# Patient Record
Sex: FEMALE | Race: WHITE
[De-identification: names, ages, dates, MRNs, and addresses within clinical notes are randomized per-mention and may not be internally consistent; named-entity substitution may affect disease eponyms.]

---

## 2019-09-16 ENCOUNTER — HOSPITAL ENCOUNTER (OUTPATIENT)
Dept: HOSPITAL 62 - ER | Age: 79
Setting detail: OBSERVATION
LOS: 3 days | Discharge: HOME HEALTH | End: 2019-09-19
Attending: FAMILY MEDICINE | Admitting: FAMILY MEDICINE
Payer: MEDICARE

## 2019-09-16 DIAGNOSIS — K21.9: ICD-10-CM

## 2019-09-16 DIAGNOSIS — I10: ICD-10-CM

## 2019-09-16 DIAGNOSIS — S22.080A: ICD-10-CM

## 2019-09-16 DIAGNOSIS — M81.0: ICD-10-CM
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
## 2019-09-16 DIAGNOSIS — R51: ICD-10-CM

## 2019-09-16 DIAGNOSIS — Z79.82: ICD-10-CM

## 2019-09-16 DIAGNOSIS — R07.89: Primary | ICD-10-CM

## 2019-09-16 DIAGNOSIS — I44.7: ICD-10-CM

## 2019-09-16 DIAGNOSIS — R41.3: ICD-10-CM

## 2019-09-16 DIAGNOSIS — E78.5: ICD-10-CM

## 2019-09-16 DIAGNOSIS — S22.070A: ICD-10-CM

## 2019-09-16 DIAGNOSIS — Z79.899: ICD-10-CM

## 2019-09-16 DIAGNOSIS — K44.9: ICD-10-CM

## 2019-09-16 DIAGNOSIS — K22.4: ICD-10-CM

## 2019-09-16 DIAGNOSIS — M19.90: ICD-10-CM

## 2019-09-16 DIAGNOSIS — Z86.73: ICD-10-CM

## 2019-09-16 DIAGNOSIS — F33.9: ICD-10-CM

## 2019-09-16 LAB
ADD MANUAL DIFF: NO
ALBUMIN SERPL-MCNC: 4.1 G/DL (ref 3.5–5)
ALP SERPL-CCNC: 50 U/L (ref 38–126)
ANION GAP SERPL CALC-SCNC: 7 MMOL/L (ref 5–19)
AST SERPL-CCNC: 27 U/L (ref 14–36)
BASOPHILS # BLD AUTO: 0.1 10^3/UL (ref 0–0.2)
BASOPHILS NFR BLD AUTO: 0.8 % (ref 0–2)
BILIRUB DIRECT SERPL-MCNC: 0 MG/DL (ref 0–0.4)
BILIRUB SERPL-MCNC: 0.3 MG/DL (ref 0.2–1.3)
BUN SERPL-MCNC: 13 MG/DL (ref 7–20)
CALCIUM: 10.1 MG/DL (ref 8.4–10.2)
CHLORIDE SERPL-SCNC: 96 MMOL/L (ref 98–107)
CK MB SERPL-MCNC: 0.7 NG/ML (ref ?–4.55)
CO2 SERPL-SCNC: 29 MMOL/L (ref 22–30)
EOSINOPHIL # BLD AUTO: 0.7 10^3/UL (ref 0–0.6)
EOSINOPHIL NFR BLD AUTO: 7.8 % (ref 0–6)
ERYTHROCYTE [DISTWIDTH] IN BLOOD BY AUTOMATED COUNT: 13.1 % (ref 11.5–14)
GLUCOSE SERPL-MCNC: 102 MG/DL (ref 75–110)
HCT VFR BLD CALC: 37.6 % (ref 36–47)
HGB BLD-MCNC: 12.9 G/DL (ref 12–15.5)
LYMPHOCYTES # BLD AUTO: 2.5 10^3/UL (ref 0.5–4.7)
LYMPHOCYTES NFR BLD AUTO: 29.5 % (ref 13–45)
MCH RBC QN AUTO: 30.8 PG (ref 27–33.4)
MCHC RBC AUTO-ENTMCNC: 34.3 G/DL (ref 32–36)
MCV RBC AUTO: 90 FL (ref 80–97)
MONOCYTES # BLD AUTO: 0.5 10^3/UL (ref 0.1–1.4)
MONOCYTES NFR BLD AUTO: 5.5 % (ref 3–13)
NEUTROPHILS # BLD AUTO: 4.8 10^3/UL (ref 1.7–8.2)
NEUTS SEG NFR BLD AUTO: 56.4 % (ref 42–78)
PLATELET # BLD: 231 10^3/UL (ref 150–450)
POTASSIUM SERPL-SCNC: 4.2 MMOL/L (ref 3.6–5)
PROT SERPL-MCNC: 6.7 G/DL (ref 6.3–8.2)
RBC # BLD AUTO: 4.18 10^6/UL (ref 3.72–5.28)
TOTAL CELLS COUNTED % (AUTO): 100 %
TROPONIN I SERPL-MCNC: < 0.012 NG/ML
WBC # BLD AUTO: 8.4 10^3/UL (ref 4–10.5)

## 2019-09-16 PROCEDURE — 97116 GAIT TRAINING THERAPY: CPT

## 2019-09-16 PROCEDURE — 97163 PT EVAL HIGH COMPLEX 45 MIN: CPT

## 2019-09-16 PROCEDURE — 71046 X-RAY EXAM CHEST 2 VIEWS: CPT

## 2019-09-16 PROCEDURE — 82550 ASSAY OF CK (CPK): CPT

## 2019-09-16 PROCEDURE — 71250 CT THORAX DX C-: CPT

## 2019-09-16 PROCEDURE — 85025 COMPLETE CBC W/AUTO DIFF WBC: CPT

## 2019-09-16 PROCEDURE — 99285 EMERGENCY DEPT VISIT HI MDM: CPT

## 2019-09-16 PROCEDURE — 85379 FIBRIN DEGRADATION QUANT: CPT

## 2019-09-16 PROCEDURE — 70551 MRI BRAIN STEM W/O DYE: CPT

## 2019-09-16 PROCEDURE — 72146 MRI CHEST SPINE W/O DYE: CPT

## 2019-09-16 PROCEDURE — 72040 X-RAY EXAM NECK SPINE 2-3 VW: CPT

## 2019-09-16 PROCEDURE — 84484 ASSAY OF TROPONIN QUANT: CPT

## 2019-09-16 PROCEDURE — G0378 HOSPITAL OBSERVATION PER HR: HCPCS

## 2019-09-16 PROCEDURE — 74220 X-RAY XM ESOPHAGUS 1CNTRST: CPT

## 2019-09-16 PROCEDURE — 93005 ELECTROCARDIOGRAM TRACING: CPT

## 2019-09-16 PROCEDURE — 93010 ELECTROCARDIOGRAM REPORT: CPT

## 2019-09-16 PROCEDURE — 82553 CREATINE MB FRACTION: CPT

## 2019-09-16 PROCEDURE — 96360 HYDRATION IV INFUSION INIT: CPT

## 2019-09-16 PROCEDURE — 36415 COLL VENOUS BLD VENIPUNCTURE: CPT

## 2019-09-16 PROCEDURE — 72070 X-RAY EXAM THORAC SPINE 2VWS: CPT

## 2019-09-16 PROCEDURE — 80048 BASIC METABOLIC PNL TOTAL CA: CPT

## 2019-09-16 PROCEDURE — 80053 COMPREHEN METABOLIC PANEL: CPT

## 2019-09-16 RX ADMIN — FAMOTIDINE SCH MG: 20 TABLET, FILM COATED ORAL at 21:40

## 2019-09-16 NOTE — EKG REPORT
SEVERITY:- ABNORMAL ECG -

SINUS RHYTHM

LEFT BUNDLE BRANCH BLOCK

:

Confirmed by: Jefferson Mckeon MD 16-Sep-2019 18:08:55

## 2019-09-16 NOTE — ER DOCUMENT REPORT
ED Cardiac





- General


Chief Complaint: Chest Pain


Stated Complaint: CHEST PAIN


Time Seen by Provider: 09/16/19 16:36


Primary Care Provider: 


LOIS RODRIGUEZ MD [Primary Care Provider] - Follow up as needed


Mode of Arrival: Ambulatory


Notes: 





Patient states that about 2:30 this afternoon, while just sitting, she began to 

experience "pressure" in the lower mid substernal region of her chest.  The 

symptoms are still going on now.  She has never had them before.  Did not have 

any associated symptoms such as shortness of breath, etc.  Has not been sick 

recently.  No nausea or vomiting.  No fevers.





Patient had a stroke 20 years ago.  She has poor memory function.





History of hypertension and high cholesterol.  Takes a baby aspirin daily.





TRAVEL OUTSIDE OF THE [place] IN LAST 30 DAYS: No





- Related Data


Allergies/Adverse Reactions: 


                                        





No Known Allergies Allergy (Verified 09/16/19 17:01)


   











Past Medical History





- General


Information source: Patient





- Social History


Smoking Status: Never Smoker


Family History: Reviewed & Not Pertinent


Patient has suicidal ideation: No


Patient has homicidal ideation: No





- Past Medical History


Cardiac Medical History: Reports: Hx Hypercholesterolemia, Hx Hypertension


Neurological Medical History: Reports: Hx Cerebrovascular Accident - 2 years ago

., Other - Memory loss.


Endocrine Medical History: Denies: Hx Diabetes Mellitus Type 1, Hx Diabetes 

Mellitus Type 2


Surgical Hx: Negative





Review of Systems





- Review of Systems


Notes: 





REVIEW OF SYSTEMS:





CONSTITUTIONAL :  Denies fever.


  


EENT:   Denies eye, ear, nose or mouth or throat pain or other symptoms.





CARDIOVASCULAR: See HPI.





RESPIRATORY:  Denies cough, chest congestion, or shortness of breath.





GASTROINTESTINAL:  Denies abdominal pain or nausea, vomiting, or diarrhea.





GENITOURINARY:  Denies difficulty or painful urinating, urinary frequency, blood

in urine.





MUSCULOSKELETAL:  Denies back or neck pain.  Denies joint pain or swelling.





SKIN:   Denies rash or skin lesions.





NEUROLOGICAL:  Denies LOC or altered mental status.  Denies headache.  Denies 

sensory loss or motor deficits.





ALL OTHER SYSTEMS REVIEWED AND NEGATIVE.





Physical Exam





- Vital signs


Vitals: 


                                        











Temp Pulse Resp BP Pulse Ox


 


 98.4 F   63   20   157/72 H  98 


 


 09/16/19 16:19  09/16/19 16:19  09/16/19 16:19  09/16/19 16:19  09/16/19 16:19











Interpretation: Hypertensive - Mild.  No: Hypoxic


Notes: 





PHYSICAL EXAMINATION:





GENERAL: Well-appearing, in no acute distress.  Mildly hypertensive.


HEAD: Atraumatic, normocephalic.





EYES: Pupils equal round and reactive to light, extraocular movements intact.





ENT: oropharynx clear without exudates.  Moist mucous membranes.





NECK: Normal range of motion, supple.





LUNGS: Breath sounds clear and equal bilaterally.





HEART: Regular rate and rhythm without murmurs.





ABDOMEN: Soft, nontender.  No guarding or rebound.  No masses.





BACK:  No tenderness throughout entire back.





EXTREMITIES: Normal range of motion without pain.





NEUROLOGICAL:  Normal speech, normal gait.  Normal sensory, motor, and reflex 

exams.  Awake, alert, and oriented x3.  Cranial nerves normal.  Says she has 

significant memory loss.





PSYCH: Normal mood, normal affect.





SKIN: Warm, dry, no rashes.





Course





- Re-evaluation


Re-evalutation: 





09/16/19 18:39


Spoke with Dr. Rodriguez, patient's primary care provider, and he requested that I 

talk with the cardiologist, Dr. Limon.  I called Dr. Limon and he 

recommended that the patient be put in and ruled out.  Patient will be admitted 

to [facility] to Dr. Rodriguez.








09/16/19 18:46


 never came to the emergency department and saw the patient before she was 

admitted.











- Vital Signs


Vital signs: 


                                        











Temp Pulse Resp BP Pulse Ox


 


 98.4 F   63   17   172/82 H  100 


 


 09/16/19 16:19  09/16/19 16:19  09/16/19 18:01  09/16/19 18:01  09/16/19 18:01














- Laboratory


Result Diagrams: 


                                 09/16/19 16:50





                                 09/16/19 16:50


Laboratory results interpreted by me: 


                                        











  09/16/19 09/16/19





  16:50 16:50


 


Eos % (Auto)  7.8 H 


 


Absolute Eos (auto)  0.7 H 


 


Sodium   132.3 L


 


Chloride   96 L


 


Est GFR (MDRD) Non-Af   58 L














- Diagnostic Test


Radiology results interpreted by me: 





09/16/19 18:00


Chest x-ray shows mild cardiomegaly, but otherwise normal.





- EKG Interpretation by Me


EKG shows normal: Sinus rhythm


Rate: Normal


Rhythm: NSR


Axis/QRS: LBBB


Voltage: Consistant with LVH





Discharge





- Discharge


Clinical Impression: 


 Chest pain





Condition: Stable


Disposition: ADMITTED AS OBSERVATION


Admitting Provider: Brett


Unit Admitted: [facility]


Referrals: 


LOIS RODRIGUEZ MD [Primary Care Provider] - Follow up as needed

## 2019-09-16 NOTE — RADIOLOGY REPORT (SQ)
EXAM DESCRIPTION:  CHEST 2 VIEWS



COMPLETED DATE/TIME:  9/16/2019 5:17 pm



REASON FOR STUDY:  chest pain



COMPARISON:  None.



TECHNIQUE:  Single frontal radiographic view of the chest acquired.



NUMBER OF VIEWS:  One view.



LIMITATIONS:  None.



FINDINGS:  LUNGS AND PLEURA: No pneumothorax. No consolidation or pleural effusion.

MEDIASTINUM AND HILAR STRUCTURES: Age-appropriate contour.

HEART AND VASCULAR STRUCTURES: Normal size.

BONES: Moderate Compression deformity of a lower thoracic spine vertebral body, age undetermined.

HARDWARE: None in the chest.

OTHER: No other significant finding.



IMPRESSION:  Moderate Compression deformity of a lower thoracic spine vertebral body, age undetermine
d.

No acute cardiopulmonary finding.



TECHNICAL DOCUMENTATION:  JOB ID:  [id number]

[id number]

 2011 [employer]- All Rights Reserved



Reading location - IP/workstation name: [email or web address]

## 2019-09-16 NOTE — ER DOCUMENT REPORT
ED Medical Screen (RME)





- General


Chief Complaint: Chest Pain


Stated Complaint: CHEST PAIN


Time Seen by Provider: 09/16/19 16:36


Primary Care Provider: 


LOIS RODRIGUEZ MD [Primary Care Provider] - Follow up as needed


Mode of Arrival: Ambulatory


Information source: Patient


Notes: 





Patient is a 79-year-old female presenting to the emergency department with 

midsternal chest pain.  Patient reports pain started approximately 2 hours prior

to arrival.  She denies any nausea, shortness of breath or diaphoresis.  She 

denies any radiation of this pain.  She reports the pain is a 4/5 on the pain 

scale.  She denies any history of MIs in the past.





Exam:


Lung sounds clear and equal bilaterally, no acute distress noted.


Heart sounds S1-S2 present with no ectopy noted, normal rate, normal rhythm.


No tenderness with palpation of the chest wall.





I have greeted and performed a rapid initial assessment of this patient.  A 

comprehensive ED assessment and evaluation of the patient, analysis of test 

results and completion of the medical decision making process will be conducted 

by additional ED providers.  I have specifically instructed the patient or 

family members with the patient to immediately return to any nursing staff 

should anything change in the patient's condition or with their chief complaint.





This medical record was dictated with voice recognizing software.  There may be 

grammatical, syntax errors that are unintended.\





TRAVEL OUTSIDE OF THE [place] IN LAST 30 DAYS: No





- Related Data


Allergies/Adverse Reactions: 


                                        





No Known Allergies Allergy (Verified 09/16/19 17:01)


   











Past Medical History





- Past Medical History


Cardiac Medical History: Reports: Hx Hypercholesterolemia, Hx Hypertension





Physical Exam





- Vital signs


Vitals: 





                                        











Temp Pulse Resp BP Pulse Ox


 


 98.4 F   63   20   157/72 H  98 


 


 09/16/19 16:19  09/16/19 16:19  09/16/19 16:19  09/16/19 16:19  09/16/19 16:19














Course





- Vital Signs


Vital signs: 





                                        











Temp Pulse Resp BP Pulse Ox


 


 98.4 F   63   20   157/72 H  98 


 


 09/16/19 16:19  09/16/19 16:19  09/16/19 16:19  09/16/19 16:19  09/16/19 16:19














Doctor's Discharge





- Discharge


Referrals: 


LOIS RODRIGUEZ MD [Primary Care Provider] - Follow up as needed

## 2019-09-17 LAB
ANION GAP SERPL CALC-SCNC: 5 MMOL/L (ref 5–19)
BUN SERPL-MCNC: 11 MG/DL (ref 7–20)
CALCIUM: 9.5 MG/DL (ref 8.4–10.2)
CHLORIDE SERPL-SCNC: 104 MMOL/L (ref 98–107)
CK MB SERPL-MCNC: 0.63 NG/ML (ref ?–4.55)
CK MB SERPL-MCNC: 0.79 NG/ML (ref ?–4.55)
CO2 SERPL-SCNC: 27 MMOL/L (ref 22–30)
GLUCOSE SERPL-MCNC: 90 MG/DL (ref 75–110)
POTASSIUM SERPL-SCNC: 4.4 MMOL/L (ref 3.6–5)
TROPONIN I SERPL-MCNC: < 0.012 NG/ML
TROPONIN I SERPL-MCNC: < 0.012 NG/ML

## 2019-09-17 RX ADMIN — ENOXAPARIN SODIUM SCH MG: 40 INJECTION SUBCUTANEOUS at 10:35

## 2019-09-17 RX ADMIN — SUCRALFATE SCH GM: 1 TABLET ORAL at 21:41

## 2019-09-17 RX ADMIN — SUCRALFATE SCH GM: 1 TABLET ORAL at 17:07

## 2019-09-17 RX ADMIN — FAMOTIDINE SCH MG: 20 TABLET, FILM COATED ORAL at 10:35

## 2019-09-17 RX ADMIN — FAMOTIDINE SCH MG: 20 TABLET, FILM COATED ORAL at 21:41

## 2019-09-17 NOTE — RADIOLOGY REPORT (SQ)
EXAM DESCRIPTION:  MRI THORACIC SPINE WITHOUT



COMPLETED DATE/TIME:  9/17/2019 10:03 am



REASON FOR STUDY:  t 12 compression fx



COMPARISON:  None.



TECHNIQUE:  Sagittal and Axial imaging includes T1, T2, STIR and gradient echo sequences.



LIMITATIONS:  None.



FINDINGS:  LOCALIZER: No worrisome findings.

ALIGNMENT: Normal.

VERTEBRAE: Chronic mild compression deformities at T9 and T12.  No acute findings.

BONE MARROW: Normal. No marrow replacement or reactive changes.

HARDWARE: None in the spine.

CORD: Normal in size and signal intensity.

SOFT TISSUES: No soft tissue masses.

THORACIC DISCS T1-T12: No significant spinal stenosis or exit foraminal stenosis.

LOWER CERVICAL: Incompletely imaged. No significant spinal stenosis or exit foraminal stenosis.

UPPER LUMBAR: Incompletely imaged.  No significant spinal stenosis or exit foraminal stenosis.

OTHER: No other significant finding.



IMPRESSION:  Chronic compression deformities at T9 and T12.  No acute findings.



TECHNICAL DOCUMENTATION:  JOB ID:  [id number]

 2011 [employer]- All Rights Reserved



Reading location - IP/workstation name: [email or web address]

## 2019-09-17 NOTE — PDOC CONSULTATION
Consultation-Blank


Consultation: 





CARDIOLOGY CONSULTATION by Dr. Shari Limon on 9/17/2019.  Patient seen at

9:30 AM.  60 minutes spent on this patient with more than 50% of time spent 

direct patient care.





REASON FOR CONSULTATION: Patient with prolonged chest pressure.





CONSULT REQUESTING PHYSICIAN: Dr. West


HISTORY PRESENT ILLNESS: Note the patient did recent memory loss, and hence very

difficult to obtain history from the patient.  The patient is a 79-year-old 

 female with known history of hypertension and hyperlipidemia, and 

memory loss who states that she has been having chest pressure which is 

constant.  She denies chest wall tenderness or any reproducibility of the 

patient's chest pressure.  She states that the chest pressure slightly better 

when she sits up but worse when she lies down.  She has no history of hiatal 

hernia.  Of note the patient's troponin I has been negative so far.  The patient

has a history of chronic left bundle branch block pattern on her EKG.  The 

patient was given 1 g of sucralfate and her chest pressure resolved.  Hence 

clearly this is noncardiac and secondary to a GI problem.  She has no dysphagia.

 Of note the patient also has a compression fracture of undetermined age of 89 

and T12 vertebra.  Pain management has been asked to see the patient for this.  

This is to see whether the fractures could be causing the patient's symptoms of 

pressure..  There is no palpitations.  There is no shortness of breath.  There 

is no PND orthopnea or leg edema.  There is no syncope.





Past Medical History


Cardiac Medical History: Reports: Hyperlipidema, Hypertension


Neurological Medical History: Reports: Other - Memory loss.


Endocrine Medical History: 


   Denies: Diabetes Mellitus Type 1, Diabetes Mellitus Type 2


Musculoskeltal Medical History: Reports: Arthritis


Psychiatric Medical History: Reports: Depression





RESUSCITATION STATUS: The patient is a full code.  Her  is her surrogate 

healthcare decision maker.





Social History


Information Source: Patient


Smoking Status: Never Smoker


Frequency of Alcohol Use: None


Hx Recreational Drug Use: No


Drugs: None


Hx Prescription Drug Abuse: No





Family History


Family History: Reviewed & Not Pertinent


Parental Family History Reviewed: Yes


Children Family History Reviewed: Yes


Sibling(s) Family History Reviewed.: Yes





Medication/Allergy


Home Medications: 


Alendronate Sodium 70 mg PO Q7D 09/16/19 


Atorvastatin Calcium [Lipitor 10 mg Tablet] 10 mg PO QHS 09/16/19 


Citalopram Hydrobromide [Celexa 20 mg Tablet] 30 mg PO DAILY 09/16/19 


Levocetirizine Dihydrochloride [Allergy Relief] 5 mg PO DAILY 09/16/19 


Losartan Potassium [Cozaar 50 mg Tablet] 50 mg PO DAILY 09/16/19 





Allergies/Adverse Reactions: No Known Allergies Allergy (Verified 09/16/19 

17:01)


Review of Systems


Constitutional: ABSENT: chills, fever(s), headache(s), weight gain, weight loss


Eyes: ABSENT: visual disturbances


Ears: ABSENT: hearing changes


Cardiovascular: PRESENT: chest pain.  ABSENT: dyspnea on exertion, edema, 

orthropnea, palpitations


Respiratory: ABSENT: cough, hemoptysis


Gastrointestinal: ABSENT: abdominal pain, constipation, diarrhea, hematemesis, 

hematochezia, nausea, vomiting


Genitourinary: ABSENT: dysuria, hematuria


Musculoskeletal: ABSENT: joint swelling


Integumentary: ABSENT: rash, wounds


Neurological: ABSENT: abnormal gait, abnormal speech, confusion, dizziness, 

focal weakness, syncope


Psychiatric: ABSENT: anxiety, depression, homidical ideation, suicidal ideation


Endocrine: ABSENT: cold intolerance, heat intolerance, menstrual abnormalities, 

polydipsia, polyuria


Hematologic/Lymphatic: ABSENT: easy bleeding, easy bruising, lymphadenopathy





PHYSICAL EXAMINATION: The patient is a frail build and appears to be chronically

ill.  She does complain of pressure in the chest earlier prior to her receiving 

sucralfate, but does not appear to be in any major distress.


                                                                Selected Entries











  09/17/19 09/17/19





  08:17 08:59


 


Temperature 97.7 F 


 


Temperature Oral 





Source  


 


Pulse Rate 58 L 


 


Respiratory 18 





Rate  


 


Blood Pressure 157/70 H 


 


Blood Pressure 99 





Mean  


 


BP Location Right Arm 


 


BP Position Supine 


 


O2 Sat by Pulse 98 





Oximetry  


 


Fraction of  21





Inspired Oxygen  





(FIO2)  


 


Oxygen Delivery Room Air 





Method  








General appearance: PRESENT: no acute distress, well-developed, well-nourished


Head exam: PRESENT: atraumatic, normocephalic


Eye exam: PRESENT: conjunctiva pink, EOMI, PERRLA.  ABSENT: scleral icterus


Ear exam: PRESENT: normal external ear exam


Mouth exam: PRESENT: moist, tongue midline


Neck exam: PRESENT: full ROM.  ABSENT: carotid bruit, JVD, lymphadenopathy, 

thyromegaly


Respiratory exam: PRESENT: chest wall tenderness, clear to auscultation cindy


Cardiovascular exam: PRESENT: S1-S2 is heard.  There is no S3 gallop.  There is 

no S4 gallop.  There is systolic murmur left sternal border and the apex.  There

is no rub


Pulses: PRESENT: normal dorsalis pedis pul, +2 pedal pulses bilateral


Vascular exam: PRESENT: normal capillary refill


GI/Abdominal exam: PRESENT: normal bowel sounds, soft.  ABSENT: distended, 

guarding, mass, organolmegaly, rebound, tenderness


Rectal exam: PRESENT: deferred


Musculoskeletal exam: PRESENT: ambulatory


Neurological exam: PRESENT: alert, awake, oriented to person, oriented to place,

oriented to time, oriented to situation, CN II-XII grossly intact.  ABSENT: 

motor sensory deficit


Psychiatric exam: PRESENT: appropriate affect, normal mood.  ABSENT: homicidal 

ideation, suicidal ideation


Skin exam: PRESENT: dry, intact, warm.  ABSENT: cyanosis, rash





EKG: Sinus rhythm with left bundle branch block pattern.  This seems to be 

chronic.


Current Medications











Generic Name Dose Route Start Last Admin





  Trade Name Freq  PRN Reason Stop Dose Admin


 


Acetaminophen  650 mg  09/16/19 18:56 





  Tylenol 650 Mg Supp  DC  10/16/19 18:55 





  Q4HP PRN  





  FOR PAIN OR TEMP  


 


Enoxaparin Sodium  40 mg  09/17/19 10:00  09/17/19 10:35





  Lovenox Inj 40 Mg/0.4 Ml Disp.Syrin  SUBCUT  10/17/19 09:59  40 mg





  DAILY DIEGO   Administration


 


Famotidine  20 mg  09/16/19 22:00  09/17/19 10:35





  Pepcid 20 Mg Tablet  PO  10/16/19 21:59  20 mg





  Q12 DIEGO   Administration


 


Sucralfate  1 gm  09/17/19 18:00  09/17/19 17:07





  Carafate 1 Gm Tablet  PO  10/17/19 17:59  1 gm





  QID DIEGO   Administration














Discontinued Medications














Generic Name Dose Route Start Last Admin





  Trade Name Freq  PRN Reason Stop Dose Admin


 


Acetaminophen  650 mg  09/16/19 17:58  09/16/19 18:31





  Tylenol 325 Mg Tablet  PO  09/16/19 17:59  650 mg





  NOW ONE   Administration


 


Acetaminophen  650 mg  09/16/19 23:24  09/17/19 02:06





  Tylenol 325 Mg Tablet  PO  09/16/19 23:25  650 mg





  NOW ONE   Administration


 


Acetaminophen  Confirm  09/17/19 02:04 





  Tylenol 325 Mg Tablet  Administered  09/17/19 02:05 





  Dose  





  325 mg  





  .ROUTE  





  .STK-MED ONE  


 


Aspirin  162 mg  09/16/19 16:45  09/16/19 17:00





  Aspirin 81 Mg Chewable Tablet  PO  09/16/19 16:46  162 mg





  NOW ONE   Administration


 


Sodium Chloride  1,000 mls @ 250 mls/hr  09/16/19 17:55  09/17/19 02:01





  Nacl 0.9% 1000 Ml Iv Soln  IV  09/16/19 21:54  Infused





  NOW ONE   Infusion


 


Nitroglycerin  1 each  09/16/19 17:56  09/16/19 18:31





  Nitro-Dur 2.5 Mg (0.1 Mg/Hr) Transdermal Ptch  TD  09/16/19 17:57  1 each





  NOW ONE   Administration


 


Sucralfate  1 gm  09/17/19 11:45  09/17/19 12:07





  Carafate 1 Gm Tablet  PO  09/17/19 11:46  1 gm





  NOW ONE   Administration








                                Abnormal - 24 hr











  09/17/19





  07:53


 


Sodium  136.0 L








                                        





Cervical Spine X-Ray  09/16/19 00:00


IMPRESSION:  NO SIGNIFICANT RADIOGRAPHIC FINDING IN THE CERVICAL SPINE.


 








Thoracic Spine X-Ray  09/16/19 00:00


IMPRESSION:  T12 compression deformity with approximately 40% loss of height.  

Recommend MRI for further evaluation.  The patient may be a candidate for 

kyphoplasty.


 








Chest X-Ray  09/16/19 17:02


IMPRESSION:  Moderate Compression deformity of a lower thoracic spine vertebral 

body, age undetermined.


No acute cardiopulmonary finding.


 








Chest CT  09/17/19 00:00


IMPRESSION:  Mild central bronchiectasis.  Scarring in the lung apices.  No 

consolidation or effusions.  No suspicious pulmonary nodules.


Mild compression deformities at T9 and T12.  Age is indeterminate.  Recommend 

correlation with MRI.  The patient may be a candidate for kyphoplasty if 

clinically indicated.


 








Thoracic Spine MRI  09/17/19 00:00


IMPRESSION:  Chronic compression deformities at T9 and T12.  No acute findings.





IMPRESSION/RECOMMENDATION:





1.  Prolonged chest pressure with negative serial troponin I measurements.  

Symptoms relieved with sucralfate.  Hence most likely secondary to GERD/hiatal 

hernia.  Hence clearly noncardiac.


2.  Hypertension: Well controlled.  Continue losartan


3.  Hyperlipidemia.  Continue statins.


4.  Compression fracture of T9 and T12 vertebrae.


5.  Chronic left bundle branch block pattern


6.  SYSTOLIC MURMUR: Later would recommend getting an echocardiogram to assess 

this.  


7.  DEPRESSION: Continue current treatment.  


8.  Memory loss patient not early Alzheimer's.  


9.  Multiple CAD risk factors namely age, hypertension, and hyperlipidemia.  Was

the patient's compression fracture is stabilized would recommend that the 

patient have a IV Lexiscan Cardiolite stress test as an outpatient.  Also would 

recommend the patient have an echocardiogram to assess the murmur.  Clinically 

the patient does not have significant aortic stenosis.  Patient has probably 

mild mitral regurgitation.  But will get an echo as an outpatient to confirm 

this.





Medications reviewed.  Medication management and medical management discussed 

with Dr. West.  Note sucralfate was added but the patient become asymptomatic. 

Medical decision making is of moderate complexity.  60 minutes spent on this 

patient with more than 50% of time spent direct patient care.  Will sign off and

follow the patient as an outpatient.

## 2019-09-17 NOTE — RADIOLOGY REPORT (SQ)
EXAM DESCRIPTION:  CT CHEST WITHOUT



COMPLETED DATE/TIME:  9/17/2019 9:30 am



REASON FOR STUDY:  chest pain



COMPARISON:  Chest x-ray dated 9/16/2019



TECHNIQUE:  CT scan performed of the chest without intravenous contrast.  Images reviewed with lung, 
soft tissue and bone windows.  Reconstructed coronal and sagittal MPR images reviewed.  All images st
ored on PACS.

All CT scanners at this facility use dose modulation, iterative reconstruction, and/or weight based d
osing when appropriate to reduce radiation dose to as low as reasonably achievable (ALARA).

[facility]: Dose Right  [facility]: CareDose    [facility]: Dose Right    [facility]: Teradose 4D    [facility]: Smart Technologies



RADIATION DOSE:  CT Rad equipment meets quality standard of care and radiation dose reduction techniq
ues were employed. CTDIvol: 3.4 mGy. DLP: 152 mGy-cm. mGy.



LIMITATIONS:  No technical limitations.



FINDINGS:  LUNGS AND PLEURA: No consolidation or effusions.  There is scarring in the lung apices.  T
here is mild central bronchiectasis.  No suspicious pulmonary nodules.

HILAR AND MEDIASTINAL STRUCTURES: No identified masses or abnormal nodes.  No obvious aneurysm.

HEART AND VASCULAR STRUCTURES: No aneurysm.  No pericardial effusion.

UPPER ABDOMEN: No significant findings.  Limited exam.

THYROID AND OTHER SOFT TISSUES: No masses.  No adenopathy.

BONES: There are mild compression deformities at T9 and T12.  Age is indeterminate.  Recommend correl
ation with MRI.  The patient may be a candidate for kyphoplasty.

HARDWARE: None in the chest.

OTHER: No other significant findings.



IMPRESSION:  Mild central bronchiectasis.  Scarring in the lung apices.  No consolidation or effusion
s.  No suspicious pulmonary nodules.

Mild compression deformities at T9 and T12.  Age is indeterminate.  Recommend correlation with MRI.  
The patient may be a candidate for kyphoplasty if clinically indicated.



TECHNICAL DOCUMENTATION:  JOB ID:  [id number]

Quality ID # [id number]: Final reports with documentation of one or more dose reduction techniques (e.g., Au
tomated exposure control, adjustment of the mA and/or kV according to patient size, use of iterative 
reconstruction technique)

 2011 [employer]- All Rights Reserved



Reading location - IP/workstation name: [email or web address]

## 2019-09-17 NOTE — PDOC PROGRESS REPORT
Subjective


Progress Note for:: 09/17/19


Subjective:: 





Patient is feeling much better still having some pain especially described in 

the sternal area


No short of breath


Recent x-ray of the T-spine suggest the T12 compression fracture with the 40% 

loss of height


Reason For Visit: 


CHEST PAIN








Physical Exam


Vital Signs: 


                                        











Temp Pulse Resp BP Pulse Ox


 


 97.7 F   58 L  18   157/70 H  98 


 


 09/17/19 08:17  09/17/19 08:17  09/17/19 08:17  09/17/19 08:17  09/17/19 08:17








                                 Intake & Output











 09/16/19 09/17/19 09/18/19





 06:59 06:59 06:59


 


Intake Total  1100 


 


Balance  1100 


 


Weight  59.7 kg 











General appearance: PRESENT: no acute distress, well-developed, well-nourished


Head exam: PRESENT: atraumatic, normocephalic


Eye exam: PRESENT: conjunctiva pink, EOMI, PERRLA.  ABSENT: scleral icterus


Ear exam: PRESENT: normal external ear exam


Mouth exam: PRESENT: moist, tongue midline


Neck exam: PRESENT: full ROM.  ABSENT: carotid bruit, JVD, lymphadenopathy, 

thyromegaly


Respiratory exam: PRESENT: clear to auscultation cindy


Cardiovascular exam: PRESENT: RRR.  ABSENT: diastolic murmur, rubs, systolic 

murmur


Pulses: PRESENT: normal dorsalis pedis pul, +2 pedal pulses bilateral


Vascular exam: PRESENT: normal capillary refill


GI/Abdominal exam: PRESENT: normal bowel sounds, soft.  ABSENT: distended, 

guarding, mass, organolmegaly, rebound, tenderness


Rectal exam: PRESENT: deferred


Musculoskeletal exam: PRESENT: ambulatory


Neurological exam: PRESENT: alert, awake, oriented to person, oriented to place,

oriented to time, oriented to situation, CN II-XII grossly intact.  ABSENT: 

motor sensory deficit


Psychiatric exam: PRESENT: appropriate affect, normal mood.  ABSENT: homicidal 

ideation, suicidal ideation


Skin exam: PRESENT: dry, intact, warm.  ABSENT: cyanosis, rash





Results


Laboratory Results: 


                                        





                                 09/16/19 16:50 





                                 09/17/19 07:53 





                                        











  09/16/19 09/16/19 09/17/19





  16:50 16:50 07:53


 


WBC  8.4  


 


RBC  4.18  


 


Hgb  12.9  


 


Hct  37.6  


 


MCV  90  


 


MCH  30.8  


 


MCHC  34.3  


 


RDW  13.1  


 


Plt Count  231  


 


Seg Neutrophils %  56.4  


 


Sodium   132.3 L  136.0 L


 


Potassium   4.2  4.4


 


Chloride   96 L  104


 


Carbon Dioxide   29  27


 


Anion Gap   7  5


 


BUN   13  11


 


Creatinine   0.94  0.85


 


Est GFR ( Amer)   > 60  > 60


 


Glucose   102  90


 


Calcium   10.1  9.5


 


Total Bilirubin   0.3 


 


AST   27 


 


Alkaline Phosphatase   50 


 


Total Protein   6.7 


 


Albumin   4.1 








                                        











  09/16/19 09/16/19 09/16/19





  16:50 18:54 18:54


 


Creatine Kinase    50


 


CK-MB (CK-2)   0.70 


 


Troponin I  < 0.012  < 0.012 














  09/17/19 09/17/19 09/17/19





  00:46 00:46 07:53


 


Creatine Kinase  45   38


 


CK-MB (CK-2)   0.79 


 


Troponin I   < 0.012 














  09/17/19





  07:53


 


Creatine Kinase 


 


CK-MB (CK-2)  0.63


 


Troponin I  < 0.012











Impressions: 


                                        





Cervical Spine X-Ray  09/16/19 00:00


IMPRESSION:  NO SIGNIFICANT RADIOGRAPHIC FINDING IN THE CERVICAL SPINE.


 








Thoracic Spine X-Ray  09/16/19 00:00


IMPRESSION:  T12 compression deformity with approximately 40% loss of height.  

Recommend MRI for further evaluation.  The patient may be a candidate for 

kyphoplasty.


 








Chest X-Ray  09/16/19 17:02


IMPRESSION:  Moderate Compression deformity of a lower thoracic spine vertebral 

body, age undetermined.


No acute cardiopulmonary finding.


 














Assessment & Plan





- Diagnosis


(1) Chest pain


Qualifiers: 


   Chest pain type: unspecified   Qualified Code(s): R07.9 - Chest pain, unspec

ified   


Is this a current diagnosis for this admission?: Yes   


Plan: 


ALT cardiac enzyme EKG is normal we will do the CT of the chest and also follow 

with the cardiology








(2) T12 compression fracture


Qualifiers: 


   Encounter type: initial encounter   Qualified Code(s): S22.080A - Wedge 

compression fracture of T11-T12 vertebra, initial encounter for closed fracture 

 


Is this a current diagnosis for this admission?: Yes   


Plan: 


We will get the MRI of the T-spineConsult the pain management








(3) Osteoporosis


Qualifiers: 


   Presence of current pathological fracture: unspecified 


Is this a current diagnosis for this admission?: Yes   





(4) Major depression


Qualifiers: 


   Major depression recurrence: recurrent   Psychotic features: without 

psychotic features 


Is this a current diagnosis for this admission?: Yes   





(5) Memory problem


Is this a current diagnosis for this admission?: Yes   





(6) Hypertension


Qualifiers: 


   Hypertension type: essential hypertension   Qualified Code(s): I10 - 

Essential (primary) hypertension   


Is this a current diagnosis for this admission?: Yes   


Plan: 


Continues to losartan








(7) Hyperlipidemia


Qualifiers: 


   Hyperlipidemia type: unspecified   Qualified Code(s): E78.5 - Hyperlipidemia,

unspecified   


Is this a current diagnosis for this admission?: Yes   





- Time


Time Spent with patient: 15-24 minutes


Medications reviewed and adjusted accordingly: Yes


Anticipated discharge: Home


Within: Other





- Plan Summary


Plan Summary: 





See MD orders

## 2019-09-17 NOTE — RADIOLOGY REPORT (SQ)
EXAM DESCRIPTION:  CERV SP 3 VIEW OR LESS



COMPLETED DATE/TIME:  9/16/2019 7:27 pm



REASON FOR STUDY:  back pain



COMPARISON:  None.



NUMBER OF VIEWS:  Three views.



TECHNIQUE:  AP, lateral and odontoid radiographic images acquired of the cervical spine.



LIMITATIONS:  None.



FINDINGS:  MINERALIZATION: Normal.

ALIGNMENT: Anatomic.

VERTEBRAE: Vertebral bodies of normal height.

DISCS: No significant disc space narrowing.  No large osteophytes.

HARDWARE: None in the spine.

SOFT TISSUES: No masses or calcifications. Lung apices clear.

OTHER: No other significant finding.



IMPRESSION:  NO SIGNIFICANT RADIOGRAPHIC FINDING IN THE CERVICAL SPINE.



TECHNICAL DOCUMENTATION:  JOB ID:  [id number]

 2011 [employer]- All Rights Reserved



Reading location - IP/workstation name: [email or web address]

## 2019-09-17 NOTE — RADIOLOGY REPORT (SQ)
EXAM DESCRIPTION:  T SPINE AP/LAT



COMPLETED DATE/TIME:  9/16/2019 7:27 pm



REASON FOR STUDY:  gladis pain



COMPARISON:  None.



NUMBER OF VIEWS:  Two views.



TECHNIQUE:  AP and lateral radiographic images acquired of the thoracic spine.



LIMITATIONS:  None.



FINDINGS:  MINERALIZATION: Osteopenia.

ALIGNMENT: Normal.  No scoliosis.

VERTEBRAE: There is approximately 40% loss of height at T12.  Recommend MRI for further evaluation.  
The patient may be a candidate for kyphoplasty.

DISCS: No significant loss of height or significant narrowing.  No large osteophytes.

HARDWARE: None in the spine.

MEDIASTINUM AND SOFT TISSUES: Normal heart size and aortic contour.  No soft tissue abnormality.

VISUALIZED LUNG FIELDS: Clear.

OTHER: No other significant finding.



IMPRESSION:  T12 compression deformity with approximately 40% loss of height.  Recommend MRI for furt
her evaluation.  The patient may be a candidate for kyphoplasty.



TECHNICAL DOCUMENTATION:  JOB ID:  [id number]

 2011 [employer]- All Rights Reserved



Reading location - IP/workstation name: [email or web address]

## 2019-09-17 NOTE — EKG REPORT
SEVERITY:- ABNORMAL ECG -

SINUS RHYTHM

LEFT BUNDLE BRANCH BLOCK

:

Confirmed by: Jefferson Mckeon MD 17-Sep-2019 07:34:04

## 2019-09-17 NOTE — PDOC H&P
History of Present Illness


Admission Date/PCP: 


  09/16/19 19:16





  LOIS RODRIGUEZ MD





Patient complains of: Chest pain


History of Present Illness: 


BRANDON LOVING is a 79 year old female


This is a 79-year-old female fairly new patient to the practice with a history 

of the major depression's and a history of osteoporosis and recently struggling 

from the memory problem came to the emergency department with the complaining of

her chest pain especially describing her midsternal in the left side started 4 

hours back before come to the ER without any activity


In the ER patient initial EKG cardiac enzyme is all negative patient's d-dimer 

is also negative


Patient's never had any previous heart disease


At this point patient was giving the nitro which mild help but still have a 

chest pain which more likely a chest wall type of the pain and initial work-up 

when I saw the patient noticed that patient has some kind of a compression 

fracture and decided to order the x-ray of the C-spine and T-spine to further 

evaluate may be they can radiate this pain


With the age and other medical issues decided to cardiology consult for further 

evaluate


Discussed with the hospital on the bedside regarding the patient's current 

conditions


Discussed with the cardiology Dr. Limon





Past Medical History


Cardiac Medical History: Reports: Hyperlipidema, Hypertension


Neurological Medical History: Reports: Other - Memory loss.


Endocrine Medical History: 


   Denies: Diabetes Mellitus Type 1, Diabetes Mellitus Type 2


Musculoskeltal Medical History: Reports: Arthritis


Psychiatric Medical History: Reports: Depression





Social History


Information Source: Patient


Smoking Status: Never Smoker


Frequency of Alcohol Use: None


Hx Recreational Drug Use: No


Drugs: None


Hx Prescription Drug Abuse: No





Family History


Family History: Reviewed & Not Pertinent


Parental Family History Reviewed: Yes


Children Family History Reviewed: Yes


Sibling(s) Family History Reviewed.: Yes





Medication/Allergy


Home Medications: 








Alendronate Sodium 70 mg PO Q7D 09/16/19 


Atorvastatin Calcium [Lipitor 10 mg Tablet] 10 mg PO QHS 09/16/19 


Citalopram Hydrobromide [Celexa 20 mg Tablet] 30 mg PO DAILY 09/16/19 


Levocetirizine Dihydrochloride [Allergy Relief] 5 mg PO DAILY 09/16/19 


Losartan Potassium [Cozaar 50 mg Tablet] 50 mg PO DAILY 09/16/19 








Allergies/Adverse Reactions: 


                                        





No Known Allergies Allergy (Verified 09/16/19 17:01)


   











Review of Systems


Constitutional: ABSENT: chills, fever(s), headache(s), weight gain, weight loss


Eyes: ABSENT: visual disturbances


Ears: ABSENT: hearing changes


Cardiovascular: PRESENT: chest pain.  ABSENT: dyspnea on exertion, edema, 

orthropnea, palpitations


Respiratory: ABSENT: cough, hemoptysis


Gastrointestinal: ABSENT: abdominal pain, constipation, diarrhea, hematemesis, 

hematochezia, nausea, vomiting


Genitourinary: ABSENT: dysuria, hematuria


Musculoskeletal: ABSENT: joint swelling


Integumentary: ABSENT: rash, wounds


Neurological: ABSENT: abnormal gait, abnormal speech, confusion, dizziness, 

focal weakness, syncope


Psychiatric: ABSENT: anxiety, depression, homidical ideation, suicidal ideation


Endocrine: ABSENT: cold intolerance, heat intolerance, menstrual abnormalities, 

polydipsia, polyuria


Hematologic/Lymphatic: ABSENT: easy bleeding, easy bruising, lymphadenopathy





Physical Exam


Vital Signs: 


                                        











Temp Pulse Resp BP Pulse Ox


 


 97.7 F   58 L  18   157/70 H  98 


 


 09/17/19 08:17  09/17/19 08:17  09/17/19 08:17  09/17/19 08:17  09/17/19 08:17








                                 Intake & Output











 09/16/19 09/17/19 09/18/19





 06:59 06:59 06:59


 


Intake Total  1100 


 


Balance  1100 


 


Weight  59.7 kg 











General appearance: PRESENT: no acute distress, well-developed, well-nourished


Head exam: PRESENT: atraumatic, normocephalic


Eye exam: PRESENT: conjunctiva pink, EOMI, PERRLA.  ABSENT: scleral icterus


Ear exam: PRESENT: normal external ear exam


Mouth exam: PRESENT: moist, tongue midline


Neck exam: PRESENT: full ROM.  ABSENT: carotid bruit, JVD, lymphadenopathy, 

thyromegaly


Respiratory exam: PRESENT: chest wall tenderness, clear to auscultation cindy


Cardiovascular exam: PRESENT: RRR.  ABSENT: diastolic murmur, rubs, systolic 

murmur


Pulses: PRESENT: normal dorsalis pedis pul, +2 pedal pulses bilateral


Vascular exam: PRESENT: normal capillary refill


GI/Abdominal exam: PRESENT: normal bowel sounds, soft.  ABSENT: distended, 

guarding, mass, organolmegaly, rebound, tenderness


Rectal exam: PRESENT: deferred


Musculoskeletal exam: PRESENT: ambulatory


Neurological exam: PRESENT: alert, awake, oriented to person, oriented to place,

oriented to time, oriented to situation, CN II-XII grossly intact.  ABSENT: 

motor sensory deficit


Psychiatric exam: PRESENT: appropriate affect, normal mood.  ABSENT: homicidal 

ideation, suicidal ideation


Skin exam: PRESENT: dry, intact, warm.  ABSENT: cyanosis, rash





Results


Laboratory Results: 


                                        





                                 09/16/19 16:50 





                                 09/17/19 07:53 





                                        











  09/16/19 09/16/19 09/17/19





  16:50 16:50 07:53


 


WBC  8.4  


 


RBC  4.18  


 


Hgb  12.9  


 


Hct  37.6  


 


MCV  90  


 


MCH  30.8  


 


MCHC  34.3  


 


RDW  13.1  


 


Plt Count  231  


 


Seg Neutrophils %  56.4  


 


Sodium   132.3 L  136.0 L


 


Potassium   4.2  4.4


 


Chloride   96 L  104


 


Carbon Dioxide   29  27


 


Anion Gap   7  5


 


BUN   13  11


 


Creatinine   0.94  0.85


 


Est GFR ( Amer)   > 60  > 60


 


Glucose   102  90


 


Calcium   10.1  9.5


 


Total Bilirubin   0.3 


 


AST   27 


 


Alkaline Phosphatase   50 


 


Total Protein   6.7 


 


Albumin   4.1 








                                        











  09/16/19 09/16/19 09/16/19





  16:50 18:54 18:54


 


Creatine Kinase    50


 


CK-MB (CK-2)   0.70 


 


Troponin I  < 0.012  < 0.012 














  09/17/19 09/17/19 09/17/19





  00:46 00:46 07:53


 


Creatine Kinase  45   38


 


CK-MB (CK-2)   0.79 


 


Troponin I   < 0.012 














  09/17/19





  07:53


 


Creatine Kinase 


 


CK-MB (CK-2)  0.63


 


Troponin I  < 0.012











Impressions: 


                                        





Cervical Spine X-Ray  09/16/19 00:00


IMPRESSION:  NO SIGNIFICANT RADIOGRAPHIC FINDING IN THE CERVICAL SPINE.


 








Thoracic Spine X-Ray  09/16/19 00:00


IMPRESSION:  T12 compression deformity with approximately 40% loss of height.  

Recommend MRI for further evaluation.  The patient may be a candidate for 

kyphoplasty.


 








Chest X-Ray  09/16/19 17:02


IMPRESSION:  Moderate Compression deformity of a lower thoracic spine vertebral 

body, age undetermined.


No acute cardiopulmonary finding.


 














Assessment & Plan





- Diagnosis


(1) Chest pain


Qualifiers: 


   Chest pain type: unspecified   Qualified Code(s): R07.9 - Chest pain, 

unspecified   


Is this a current diagnosis for this admission?: Yes   


Plan: 


We will rule out the acute coronary syndromes


Consult the cardiology


Possible chest wall pain but patients describing more deep








(2) T12 compression fracture


Qualifiers: 


   Encounter type: initial encounter   Qualified Code(s): S22.080A - Wedge 

compression fracture of T11-T12 vertebra, initial encounter for closed fracture 

 


Is this a current diagnosis for this admission?: Yes   


Plan: 


We will get the MRI of the T-spine consult the pain management evaluate for any 

kyphoplasty








(3) Osteoporosis


Qualifiers: 


   Presence of current pathological fracture: unspecified 


Is this a current diagnosis for this admission?: Yes   





(4) Major depression


Qualifiers: 


   Major depression recurrence: recurrent   Psychotic features: without 

psychotic features 


Is this a current diagnosis for this admission?: Yes   


Plan: 


Currently all stable








(5) Memory problem


Is this a current diagnosis for this admission?: Yes   


Plan: 


Patient initial blood work is all stable in the office actually patient 

scheduled for MRI of the head today as outpatient








- Time


Time Spent: 50 to 70 Minutes


Medications reviewed and adjusted accordingly: Yes


Anticipated discharge: Home


Within: Other





- Inpatient Certification


Based on my medical assessment, after consideration of the patient's 

comorbidities, presenting symptoms, or acuity I expect that the services needed 

warrant INPATIENT care.: Yes


I certify that my determination is in accordance with my understanding of 

Medicare's requirements for reasonable and necessary INPATIENT services [42 CFR 

412.3e].: Yes


Medical Necessity: Significant Comorbidiites Make Outpatient Treatment Too 

Risky, Need Close Monitoring Due to Risk of Patient Decompensation


Post Hospital Care: D/C Planner Documentation





- Plan Summary


Plan Summary: 





Admit the patient in [facility]

## 2019-09-17 NOTE — PDOC CONSULTATION
Consultation


Consult Date: 09/17/19


Provider Consulted: SUN RENEE


Consult reason:: Compression Fracture





History of Present Illness


Admission Date/PCP: 


  09/16/19 19:16





  LOIS RODRIGUEZ MD





Patient complains of: chest pain


History of Present Illness: 


BRANDON LOVING is a 79 year old female admitted for chest pain. Incidental 

findings on CXR showed compression fracture. Patient denies back pain. States 

she remains very active on a daily basis. Denies pain other than generalized 

aches and pains. She again reports being active daily keeping up with housework.










Past Medical History


Cardiac Medical History: Reports: Hyperlipidema, Hypertension


Neurological Medical History: Reports: Other - Memory loss. DENIES back pain or 

prior injury


Endocrine Medical History: 


   Denies: Diabetes Mellitus Type 1, Diabetes Mellitus Type 2


Musculoskeltal Medical History: Reports: Arthritis


Psychiatric Medical History: Reports: Depression





Social History


Smoking Status: Never Smoker


Frequency of Alcohol Use: None


Hx Recreational Drug Use: No


Drugs: None


Hx Prescription Drug Abuse: No





Family History


Family History: Reviewed & Not Pertinent


Parental Family History Reviewed: No


Children Family History Reviewed: No


Sibling(s) Family History Reviewed.: No





Medication/Allergy


Home Medications: 








Alendronate Sodium 70 mg PO Q7D 09/16/19 


Atorvastatin Calcium [Lipitor 10 mg Tablet] 10 mg PO QHS 09/16/19 


Citalopram Hydrobromide [Celexa 20 mg Tablet] 30 mg PO DAILY 09/16/19 


Levocetirizine Dihydrochloride [Allergy Relief] 5 mg PO DAILY 09/16/19 


Losartan Potassium [Cozaar 50 mg Tablet] 50 mg PO DAILY 09/16/19 








Allergies/Adverse Reactions: 


                                        





No Known Allergies Allergy (Verified 09/16/19 17:01)


   











Review of Systems


All systems: reviewed and no additional remarkable complaints except as stated -

chest pain





Physical Exam


Vital Signs: 


                                        











    


 


    


 


    








                                        











   





   


 


   


 


   


 


   








                                                                                











  09/17/19





  10:43


 


Temperature 97.7 F


 


Temperature Oral





Source 


 


Pulse Rate 71


 


Respiratory 18





Rate 


 


Blood Pressure 136/55 H


 


O2 Sat by Pulse 97





Oximetry 


 


Oxygen Delivery Room Air





Method 








General appearance: PRESENT: no acute distress, cooperative, well-developed, 

well-nourished


Head exam: PRESENT: atraumatic, normocephalic


Eye exam: PRESENT: EOMI.  ABSENT: conjunctival injection, scleral icterus


Neck exam: PRESENT: other - grossly full CROM


Respiratory exam: PRESENT: clear to auscultation cindy, unlabored


Vascular exam: ABSENT: pallor


Musculoskeletal exam: PRESENT: ambulatory, normal inspection


Neurological exam: PRESENT: alert, altered, awake, oriented to person, oriented 

to place, oriented to time, oriented to situation, CN II-XII grossly intact, 

normal gait


Psychiatric exam: PRESENT: appropriate affect, normal mood


Skin exam: PRESENT: normal color





Results


Laboratory Results: 


                                        





Impressions: 


                                        








Thoracic Spine X-Ray  09/16/19 00:00


IMPRESSION:  T12 compression deformity with approximately 40% loss of height.  

Recommend MRI for further evaluation.  The patient may be a candidate for 

kyphoplasty.


 





Chest CT  09/17/19 00:00


IMPRESSION:  Mild central bronchiectasis.  Scarring in the lung apices.  No 

consolidation or effusions.  No suspicious pulmonary nodules.


Mild compression deformities at T9 and T12.  Age is indeterminate.  Recommend 

correlation with MRI.  The patient may be a candidate for kyphoplasty if 

clinically indicated.


 





Thoracic Spine MRI  09/17/19 00:00


IMPRESSION:  Chronic compression deformities at T9 and T12.  No acute findings.


 














Assessment & Plan





- Diagnosis


(1) T12 compression fracture


Qualifiers: 


   Encounter type: initial encounter   Qualified Code(s): S22.080A - Wedge 

compression fracture of T11-T12 vertebra, initial encounter for closed fracture 

 


Is this a current diagnosis for this admission?: Yes   





(2) T9 vertebral fracture


Qualifiers: 


   Encounter type: initial encounter   Fracture type: closed   Fracture 

morphology: wedge compression   Qualified Code(s): S22.070A - Wedge compression 

fracture of T9-T10 vertebra, initial encounter for closed fracture   


Is this a current diagnosis for this admission?: Yes   





- Plan Summary


Plan Summary: 





Pleasant 80yo female admitted for chest pain with incidental findings of 

thoracic compression fractures on imaging. MRI Thoracic spine confirmed chronic 

T9 and T12 fractures. No acute or chronic pain noted by patient. No need for any

interventions at this time. Continue osteoporosis tx/protocol. Continues ADLs 

and activity as tolerated. Gave patient our business card if services needed in 

future.

## 2019-09-18 LAB
ANION GAP SERPL CALC-SCNC: 8 MMOL/L (ref 5–19)
BUN SERPL-MCNC: 14 MG/DL (ref 7–20)
CALCIUM: 9.6 MG/DL (ref 8.4–10.2)
CHLORIDE SERPL-SCNC: 102 MMOL/L (ref 98–107)
CO2 SERPL-SCNC: 26 MMOL/L (ref 22–30)
GLUCOSE SERPL-MCNC: 96 MG/DL (ref 75–110)
POTASSIUM SERPL-SCNC: 4.3 MMOL/L (ref 3.6–5)

## 2019-09-18 RX ADMIN — CITALOPRAM HYDROBROMIDE SCH MG: 20 TABLET ORAL at 09:12

## 2019-09-18 RX ADMIN — FAMOTIDINE SCH MG: 20 TABLET, FILM COATED ORAL at 21:54

## 2019-09-18 RX ADMIN — PANTOPRAZOLE SODIUM SCH MG: 20 TABLET, DELAYED RELEASE ORAL at 17:11

## 2019-09-18 RX ADMIN — FAMOTIDINE SCH MG: 20 TABLET, FILM COATED ORAL at 09:01

## 2019-09-18 RX ADMIN — ENOXAPARIN SODIUM SCH MG: 40 INJECTION SUBCUTANEOUS at 09:00

## 2019-09-18 RX ADMIN — LOSARTAN POTASSIUM SCH MG: 50 TABLET, FILM COATED ORAL at 09:12

## 2019-09-18 RX ADMIN — PANTOPRAZOLE SODIUM SCH MG: 20 TABLET, DELAYED RELEASE ORAL at 09:12

## 2019-09-18 NOTE — PDOC PROGRESS REPORT
Subjective


Progress Note for:: 09/18/19


Subjective:: 





Patient is complaining of a headache this morning's with the elevated blood 

pressures restart the blood pressure medications losartan discussed with the Dr. GEORGE suggest to put on a mild drooping 2.5 mg twice a day


Patient was given Carafate yesterday do not see much difference still having 

some pain on and off in the sternal area denied any heartburns


Patient's had MRI done yesterday with the T12 compression fracture seen by the 

pain management no need for any intervention at this point


Since seen by the cardiologist suggest a noncardiac chest pain no need for any 

further evaluation while in the hospital


CT of the chest was done without contrast which stable no other acute finding


Reason For Visit: 


CHEST PAIN








Physical Exam


Vital Signs: 


                                        











Temp Pulse Resp BP Pulse Ox


 


 97.6 F   66   16   175/77 H  96 


 


 09/18/19 04:08  09/18/19 04:08  09/18/19 04:08  09/18/19 06:46  09/18/19 04:08








                                 Intake & Output











 09/17/19 09/18/19 09/19/19





 06:59 06:59 06:59


 


Intake Total 1100 820 


 


Balance 1100 820 


 


Weight 59.7 kg 58.9 kg 











General appearance: PRESENT: no acute distress, well-developed, well-nourished


Head exam: PRESENT: atraumatic, normocephalic


Eye exam: PRESENT: conjunctiva pink, EOMI, PERRLA.  ABSENT: scleral icterus


Ear exam: PRESENT: normal external ear exam


Mouth exam: PRESENT: moist, tongue midline


Neck exam: PRESENT: full ROM.  ABSENT: carotid bruit, JVD, lymphadenopathy, 

thyromegaly


Respiratory exam: PRESENT: clear to auscultation cindy


Cardiovascular exam: PRESENT: RRR.  ABSENT: diastolic murmur, rubs, systolic 

murmur


Pulses: PRESENT: normal dorsalis pedis pul, +2 pedal pulses bilateral


Vascular exam: PRESENT: normal capillary refill


GI/Abdominal exam: PRESENT: normal bowel sounds, soft.  ABSENT: distended, 

guarding, mass, organolmegaly, rebound, tenderness


Rectal exam: PRESENT: deferred


Musculoskeletal exam: PRESENT: ambulatory


Neurological exam: PRESENT: alert, awake, oriented to person, oriented to place,

oriented to time, oriented to situation, CN II-XII grossly intact.  ABSENT: 

motor sensory deficit


Psychiatric exam: PRESENT: appropriate affect, normal mood.  ABSENT: homicidal 

ideation, suicidal ideation


Skin exam: PRESENT: dry, intact, warm.  ABSENT: cyanosis, rash





Results


Laboratory Results: 


                                        





                                 09/16/19 16:50 





                                 09/18/19 06:35 





                                        











  09/18/19





  06:35


 


Sodium  135.6 L


 


Potassium  4.3


 


Chloride  102


 


Carbon Dioxide  26


 


Anion Gap  8


 


BUN  14


 


Creatinine  0.87


 


Est GFR (African Amer)  > 60


 


Glucose  96


 


Calcium  9.6








                                        











  09/16/19 09/16/19 09/16/19





  16:50 18:54 18:54


 


Creatine Kinase    50


 


CK-MB (CK-2)   0.70 


 


Troponin I  < 0.012  < 0.012 














  09/17/19 09/17/19 09/17/19





  00:46 00:46 07:53


 


Creatine Kinase  45   38


 


CK-MB (CK-2)   0.79 


 


Troponin I   < 0.012 














  09/17/19





  07:53


 


Creatine Kinase 


 


CK-MB (CK-2)  0.63


 


Troponin I  < 0.012











Impressions: 


                                        





Cervical Spine X-Ray  09/16/19 00:00


IMPRESSION:  NO SIGNIFICANT RADIOGRAPHIC FINDING IN THE CERVICAL SPINE.


 








Thoracic Spine X-Ray  09/16/19 00:00


IMPRESSION:  T12 compression deformity with approximately 40% loss of height.  

Recommend MRI for further evaluation.  The patient may be a candidate for 

kyphoplasty.


 








Chest X-Ray  09/16/19 17:02


IMPRESSION:  Moderate Compression deformity of a lower thoracic spine vertebral 

body, age undetermined.


No acute cardiopulmonary finding.


 








Chest CT  09/17/19 00:00


IMPRESSION:  Mild central bronchiectasis.  Scarring in the lung apices.  No 

consolidation or effusions.  No suspicious pulmonary nodules.


Mild compression deformities at T9 and T12.  Age is indeterminate.  Recommend 

correlation with MRI.  The patient may be a candidate for kyphoplasty if 

clinically indicated.


 








Thoracic Spine MRI  09/17/19 00:00


IMPRESSION:  Chronic compression deformities at T9 and T12.  No acute findings.


 














Assessment & Plan





- Diagnosis


(1) Chest pain


Qualifiers: 


   Chest pain type: unspecified   Qualified Code(s): R07.9 - Chest pain, 

unspecified   


Is this a current diagnosis for this admission?: Yes   


Plan: 


Most likely noncardiac as per cardiac work-up is negative's CT of the chest is 

negative for any acute findings we will put the patient on omeprazole 20 mg 

twice a day








(2) T12 compression fracture


Qualifiers: 


   Encounter type: initial encounter   Qualified Code(s): S22.080A - Wedge 

compression fracture of T11-T12 vertebra, initial encounter for closed fracture 

 


Is this a current diagnosis for this admission?: Yes   


Plan: 


No need for further intervention at this point continues to PRN Tylenol








(3) Osteoporosis


Qualifiers: 


   Presence of current pathological fracture: unspecified 


Is this a current diagnosis for this admission?: Yes   





(4) Major depression


Qualifiers: 


   Major depression recurrence: recurrent   Psychotic features: without 

psychotic features 


Is this a current diagnosis for this admission?: Yes   


Plan: 


Currently all stable








(5) Memory problem


Is this a current diagnosis for this admission?: Yes   


Plan: 


It is scheduled for the MRI of the head as outpatients will do it while the 

patient in the hospital was complaining some headache








(6) Hypertension


Qualifiers: 


   Hypertension type: essential hypertension   Qualified Code(s): I10 - 

Essential (primary) hypertension   


Is this a current diagnosis for this admission?: Yes   


Plan: 


Please start the losartan 50 mg and will add the myeloid often if he needed








(7) Hyperlipidemia


Qualifiers: 


   Hyperlipidemia type: unspecified   Qualified Code(s): E78.5 - Hyperlipidemia,

unspecified   


Is this a current diagnosis for this admission?: Yes   


Plan: 


Continues to statin








(8) Gastroesophageal reflux


Qualifiers: 


   Esophagitis presence: without esophagitis   Qualified Code(s): K21.9 - [place]

ro-esophageal reflux disease without esophagitis   


Is this a current diagnosis for this admission?: Yes   


Plan: 


Start the omeprazole 20 mg p.o. twice a day








- Time


Time Spent with patient: 15-24 minutes


Medications reviewed and adjusted accordingly: Yes


Anticipated discharge: Home


Within: Other





- Plan Summary


Plan Summary: 





Discussed with the patient in the hospital and the patient's current conditions

## 2019-09-18 NOTE — PROGRESS NOTE
Provider Note


Provider Note: 





I had called Dr West about the patient


he has been in contact with Dr Itzel West wants to hold off on inpatient consult and instead wants me to schedule

outpatient tests.


Dr West to cancel the consult as discussed

## 2019-09-18 NOTE — RADIOLOGY REPORT (SQ)
EXAM DESCRIPTION:  MRI HEAD WITHOUT



COMPLETED DATE/TIME:  9/18/2019 10:00 am



REASON FOR STUDY:  Diagnosis Headache



COMPARISON:  None.



TECHNIQUE:  Multiplanar imaging includes non-contrasted T1, T2, FLAIR, and diffusion with ADC map seq
uences.  Images stored on PACS.



LIMITATIONS:  None.



FINDINGS:  ANATOMY: No anomalies. Normal vascular flow voids. Pituitary fossa normal.

CSF SPACES: Atrophy induced prominence of ventricles and CSF spaces.

CEREBRUM: High signal intensity lesions scattered throughout the white matter on FLAIR imaging with d
istribution suggesting micro-vascular ischemic changes.  No evidence of hemorrhage, mass, or extraaxi
al fluid collection.

POSTERIOR FOSSA: No signal alteration. No hemorrhage. No edema, masses or mass effect.  Internal mauri
tory canals, cerebello-pontine angles, mastoids normal.

DIFFUSION IMAGING: Negative for acute or sub-acute infarction.

ORBITS: No masses. Globes normal.

PARANASAL  SINUSES: No fluid levels.  Mucosa normal.

OTHER: No other significant finding.



IMPRESSION:  ATROPHY AND CHRONIC MICRO-VASCULAR ISCHEMIC CHANGES. OTHERWISE NORMAL MRI OF THE BRAIN W
ITHOUT INTRAVENOUS GADOLINIUM CONTRAST.

EVIDENCE OF ACUTE STROKE: NO.



TECHNICAL DOCUMENTATION:  JOB ID:  [id number]

 2011 [employer]- All Rights Reserved



Reading location - IP/workstation name: [email or web address]

## 2019-09-18 NOTE — RADIOLOGY REPORT (SQ)
EXAM DESCRIPTION:  BARIUM SWALLOW ESOPHAGUS



COMPLETED DATE/TIME:  9/18/2019 11:27 am



REASON FOR STUDY:  gastritis



COMPARISON:  None.



TECHNIQUE:  Under fluoroscopic guidance, patient ingested effervescent granules followed by thick and
 thin barium. Fluoroscopic spot images and routine radiographic images acquired and stored on PACS.

12 MM BARIUM TABLET GIVEN: Yes.

No significant delay in passage.



LIMITATIONS:  None.



FLUOROSCOPY TIME:  FLUORO TIME: 1 minutes 4 seconds of fluoroscopy was used.

8 images saved to PACS.



FINDINGS:  NEUROMUSCULAR COORDINATION OF SWALLOW: Normal. No aspiration.

ESOPHAGEAL MOTILITY: Slow primary peristalsis with tertiary contractions.  No esophageal spasm.

ESOPHAGEAL MUCOSA: Normal mucosa without masses or ulceration.

GASTRO-ESOPHAGEAL JUNCTION: Small sliding hiatal hernia with mild free-flowing gastroesophageal reflu
x.  12 mm barium tablet passed through the GE junction without delay.

NON-GI TRACT STRUCTURES: No significant finding.

OTHER: No other significant finding.



IMPRESSION:  ESOPHAGEAL DYSMOTILITY.  SMALL SLIDING HIATAL HERNIA WITH MILD REFLUX.



COMMENT:  Quality :  Final reports for procedures using fluoroscopy that document radiation exp
osure indices, or exposure time and number of fluorographic images (if radiation exposure indices are
 not available)



TECHNICAL DOCUMENTATION:  JOB ID:  [id number]

 2011 [employer]- All Rights Reserved



Reading location - IP/workstation name: [email or web address] [id number]

## 2019-09-19 VITALS — SYSTOLIC BLOOD PRESSURE: 141 MMHG | DIASTOLIC BLOOD PRESSURE: 52 MMHG

## 2019-09-19 LAB
ANION GAP SERPL CALC-SCNC: 7 MMOL/L (ref 5–19)
BUN SERPL-MCNC: 12 MG/DL (ref 7–20)
CALCIUM: 9 MG/DL (ref 8.4–10.2)
CHLORIDE SERPL-SCNC: 102 MMOL/L (ref 98–107)
CO2 SERPL-SCNC: 28 MMOL/L (ref 22–30)
GLUCOSE SERPL-MCNC: 85 MG/DL (ref 75–110)
POTASSIUM SERPL-SCNC: 4.4 MMOL/L (ref 3.6–5)

## 2019-09-19 RX ADMIN — PANTOPRAZOLE SODIUM SCH MG: 20 TABLET, DELAYED RELEASE ORAL at 11:28

## 2019-09-19 RX ADMIN — FAMOTIDINE SCH MG: 20 TABLET, FILM COATED ORAL at 11:28

## 2019-09-19 RX ADMIN — ENOXAPARIN SODIUM SCH MG: 40 INJECTION SUBCUTANEOUS at 11:27

## 2019-09-19 RX ADMIN — LOSARTAN POTASSIUM SCH MG: 50 TABLET, FILM COATED ORAL at 11:27

## 2019-09-19 RX ADMIN — CITALOPRAM HYDROBROMIDE SCH MG: 20 TABLET ORAL at 11:27

## 2019-09-19 NOTE — PDOC DISCHARGE SUMMARY
General





- Admit/Disc Date/PCP


Admission Date/Primary Care Provider: 


  09/16/19 19:16





  LOIS RODRIGUEZ MD





Discharge Date: 09/19/19





- Discharge Diagnosis


(1) Chest pain


Is this a current diagnosis for this admission?: Yes   


Summary: 


Most likely noncardiac with initial EKG cardiac enzymes is all negative seen by 

Dr. Limon suggest follow outpatients cardiology per stress test


Most likely ongoing arthritis in the T12 compression fractures with some acid 

refluxRelated pain








(2) T12 compression fracture


Is this a current diagnosis for this admission?: Yes   


Summary: 


Seen by the pain management suggest no need for any further interventions








(3) Osteoporosis


Is this a current diagnosis for this admission?: Yes   


Summary: 


Will consider the patient is to give her IV therapy instead of p.o. making the 

patient's stomach upsets








(4) Major depression


Is this a current diagnosis for this admission?: Yes   


Summary: 


Currently follow with a psychiatrist








(5) Memory problem


Is this a current diagnosis for this admission?: Yes   


Summary: 


Patient MRI all recent blood work is stable continues to B12








(6) Hypertension


Is this a current diagnosis for this admission?: Yes   


Summary: 


Continues on losartan 50 mg p.o. daily for blood pressure still elevated consi

brendan amlodipine 2.5 mg twice a day








(7) Hyperlipidemia


Is this a current diagnosis for this admission?: Yes   


Summary: 


Continues to statin








(8) Gastroesophageal reflux


Is this a current diagnosis for this admission?: Yes   


Summary: 


Continues to aspirin








- Additional Information


Discharge Diet: Cardiac


Discharge Activity: Activity As Tolerated


Prescriptions: 


Pantoprazole Sodium [Protonix 20 mg Dr Tablet] 20 mg PO BID #60 tablet.dr


Home Medications: 








Alendronate Sodium 70 mg PO Q7D 09/16/19 


Atorvastatin Calcium [Lipitor 10 mg Tablet] 10 mg PO QHS 09/16/19 


Citalopram Hydrobromide [Celexa 20 mg Tablet] 30 mg PO DAILY 09/16/19 


Levocetirizine Dihydrochloride [Allergy Relief] 5 mg PO DAILY 09/16/19 


Losartan Potassium [Cozaar 50 mg Tablet] 50 mg PO DAILY 09/16/19 


Pantoprazole Sodium [Protonix 20 mg Dr Tablet] 20 mg PO BID #60 tablet.dr 

09/19/19 











History of Present Illness


History of Present Illness: 


BRANDON LOVING is a 79 year old female


This is a 79-year-old female fairly new patient to the practice with a history 

of the major depression's and a history of osteoporosis and recently struggling 

from the memory problem came to the emergency department with the complaining of

her chest pain especially describing her midsternal in the left side started 4 

hours back before come to the ER without any activity


In the ER patient initial EKG cardiac enzyme is all negative patient's d-dimer 

is also negative


Patient's never had any previous heart disease


At this point patient was giving the nitro which mild help but still have a 

chest pain which more likely a chest wall type of the pain and initial work-up 

when I saw the patient noticed that patient has some kind of a compression 

fracture and decided to order the x-ray of the C-spine and T-spine to further 

evaluate may be they can radiate this pain


With the age and other medical issues decided to cardiology consult for further 

evaluate


Discussed with the hospital on the bedside regarding the patient's current 

conditions


Discussed with the cardiology Dr. Limon





Hospital Course


Hospital Course: 





This is a 79-year-old female is basically present in the emergency department 

with the chest pressure on the left side especially sternal area patient's 

initial EKG cardiac enzymes chest x-ray is all stable


Patient's chest x-ray suggest some T12 compression deformity underwent for the 

x-ray which confirmed this 12 compression fracture and underwent for the MRI


Patient have osteoporosis patient is currently taking the medicines


Patient MRI of the head was negative for any acute finding


Esophageal x-ray suggests a mild hiatal hernia with acid reflux


Seen by cardiology all clear


Very extensive discussion with the patient and  about all test results 

and a close follow-up


Is walking the hallway without any problems denied any chest pain no short of 

breath p.o. intake is good





Physical Exam


Vital Signs: 


                                        











Temp Pulse Resp BP Pulse Ox


 


 98.0 F   65   16   141/52 H  97 


 


 09/19/19 11:05  09/19/19 11:05  09/19/19 11:05  09/19/19 11:05  09/19/19 11:05








                                 Intake & Output











 09/18/19 09/19/19 09/20/19





 06:59 06:59 06:59


 


Intake Total 820 450 240


 


Balance 820 450 240


 


Weight 58.9 kg 59 kg 











General appearance: PRESENT: no acute distress, well-developed, well-nourished


Head exam: PRESENT: atraumatic, normocephalic


Eye exam: PRESENT: conjunctiva pink, EOMI, PERRLA.  ABSENT: scleral icterus


Ear exam: PRESENT: normal external ear exam


Mouth exam: PRESENT: moist, tongue midline


Neck exam: PRESENT: full ROM.  ABSENT: carotid bruit, JVD, lymphadenopathy, 

thyromegaly


Respiratory exam: PRESENT: clear to auscultation cindy


Cardiovascular exam: PRESENT: RRR.  ABSENT: diastolic murmur, rubs, systolic 

murmur


Pulses: PRESENT: normal dorsalis pedis pul, +2 pedal pulses bilateral


Vascular exam: PRESENT: normal capillary refill


GI/Abdominal exam: PRESENT: normal bowel sounds, soft.  ABSENT: distended, 

guarding, mass, organolmegaly, rebound, tenderness


Rectal exam: PRESENT: deferred


Extremities exam: ABSENT: pedal edema


Musculoskeletal exam: PRESENT: ambulatory


Neurological exam: PRESENT: alert, awake, oriented to person, oriented to place,

oriented to time, oriented to situation, CN II-XII grossly intact.  ABSENT: 

motor sensory deficit


Psychiatric exam: PRESENT: appropriate affect, normal mood.  ABSENT: homicidal 

ideation, suicidal ideation


Skin exam: PRESENT: dry, intact, warm.  ABSENT: cyanosis, rash





Results


Laboratory Results: 


                                        





                                 09/16/19 16:50 





                                 09/19/19 06:05 





                                        











  09/19/19





  06:05


 


Sodium  136.7 L


 


Potassium  4.4


 


Chloride  102


 


Carbon Dioxide  28


 


Anion Gap  7


 


BUN  12


 


Creatinine  0.83


 


Est GFR (African Amer)  > 60


 


Glucose  85


 


Calcium  9.0








                                        











  09/16/19 09/16/19 09/16/19





  16:50 18:54 18:54


 


Creatine Kinase    50


 


CK-MB (CK-2)   0.70 


 


Troponin I  < 0.012  < 0.012 














  09/17/19 09/17/19 09/17/19





  00:46 00:46 07:53


 


Creatine Kinase  45   38


 


CK-MB (CK-2)   0.79 


 


Troponin I   < 0.012 














  09/17/19





  07:53


 


Creatine Kinase 


 


CK-MB (CK-2)  0.63


 


Troponin I  < 0.012











Impressions: 


                                        





Cervical Spine X-Ray  09/16/19 00:00


IMPRESSION:  NO SIGNIFICANT RADIOGRAPHIC FINDING IN THE CERVICAL SPINE.


 








Thoracic Spine X-Ray  09/16/19 00:00


IMPRESSION:  T12 compression deformity with approximately 40% loss of height.  

Recommend MRI for further evaluation.  The patient may be a candidate for 

kyphoplasty.


 








Chest X-Ray  09/16/19 17:02


IMPRESSION:  Moderate Compression deformity of a lower thoracic spine vertebral 

body, age undetermined.


No acute cardiopulmonary finding.


 








Chest CT  09/17/19 00:00


IMPRESSION:  Mild central bronchiectasis.  Scarring in the lung apices.  No co

nsolidation or effusions.  No suspicious pulmonary nodules.


Mild compression deformities at T9 and T12.  Age is indeterminate.  Recommend 

correlation with MRI.  The patient may be a candidate for kyphoplasty if 

clinically indicated.


 








Thoracic Spine MRI  09/17/19 00:00


IMPRESSION:  Chronic compression deformities at T9 and T12.  No acute findings.


 








Esophagus X-Ray  09/18/19 00:00


IMPRESSION:  ESOPHAGEAL DYSMOTILITY.  SMALL SLIDING HIATAL HERNIA WITH MILD 

REFLUX.


 








Head MRI  09/18/19 00:00


IMPRESSION:  ATROPHY AND CHRONIC MICRO-VASCULAR ISCHEMIC CHANGES. OTHERWISE 

NORMAL MRI OF THE BRAIN WITHOUT INTRAVENOUS GADOLINIUM CONTRAST.


EVIDENCE OF ACUTE STROKE: NO.


 














Qualifiers





- *


PATIENT BEING DISCHARGED WITH ANY OF THE FOLLOWING DIAGNOSIS: No





Acute Heart Failure





- **


Is this a Heart Failure Patient?: No





Plan


Time Spent: Greater than 30 Minutes

## 2019-10-02 ENCOUNTER — HOSPITAL ENCOUNTER (OUTPATIENT)
Dept: HOSPITAL 62 - OROUT | Age: 79
Discharge: HOME | End: 2019-10-02
Attending: INTERNAL MEDICINE
Payer: MEDICARE

## 2019-10-02 VITALS — DIASTOLIC BLOOD PRESSURE: 50 MMHG | SYSTOLIC BLOOD PRESSURE: 146 MMHG

## 2019-10-02 DIAGNOSIS — K21.9: ICD-10-CM

## 2019-10-02 DIAGNOSIS — I10: ICD-10-CM

## 2019-10-02 DIAGNOSIS — K44.9: ICD-10-CM

## 2019-10-02 DIAGNOSIS — K29.50: Primary | ICD-10-CM

## 2019-10-02 DIAGNOSIS — K25.9: ICD-10-CM

## 2019-10-02 PROCEDURE — 43239 EGD BIOPSY SINGLE/MULTIPLE: CPT

## 2019-10-02 PROCEDURE — 00731 ANES UPR GI NDSC PX NOS: CPT

## 2019-10-02 PROCEDURE — 88305 TISSUE EXAM BY PATHOLOGIST: CPT

## 2019-10-02 NOTE — OPERATIVE REPORT
Operative Report


DATE OF SURGERY: 10/02/19


Operative Report: 





The risks benefits and alternatives of the procedure explained to the patient in

detail and informed consent is obtained.A GIF Olympus video scope was inserted 

into the patient's mouth and hypopharynx, the esophagus is identified intubated 

and insufflated, the scope was then advanced through the esophagus stomach and 

duodenum, retroflexion maneuver is done ,the esophagus stomach and first and 

second portions of the duodenum examined.


PREOPERATIVE DIAGNOSIS: Noncardiac chest pain


POSTOPERATIVE DIAGNOSIS: Severe gastritis.  Clean-based gastric ulcers.  Hiatal 

hernia.  Biopsy obtained to rule out Helicobacter pylori


OPERATION: EGD with biopsy


SURGEON: WENDIE CURIEL


ANESTHESIA: LMAC


TISSUE REMOVED OR ALTERED: As noted above.


COMPLICATIONS: 





None.


ESTIMATED BLOOD LOSS: None.


INTRAOPERATIVE FINDINGS: As noted above.


PROCEDURE: 





Patient tolerated the procedure well.


No immediate postprocedure complications are noted.


Patient is discharged in good condition.


Discharge date 10/2/2019.


Discharge diet: Regular.


Discharge activity: Regular.


2 to 3-week follow-up to discuss findings.


Patient is instructed to call the office or proceed to the emergency room should

there be any further problems or questions.


Wait on the pathology.

## 2019-10-22 ENCOUNTER — HOSPITAL ENCOUNTER (OUTPATIENT)
Dept: HOSPITAL 62 - WI | Age: 79
End: 2019-10-22
Attending: FAMILY MEDICINE
Payer: MEDICARE

## 2019-10-22 DIAGNOSIS — M81.0: Primary | ICD-10-CM

## 2019-10-22 PROCEDURE — 77080 DXA BONE DENSITY AXIAL: CPT

## 2020-01-06 ENCOUNTER — HOSPITAL ENCOUNTER (OUTPATIENT)
Dept: HOSPITAL 62 - RAD | Age: 80
End: 2020-01-06
Attending: FAMILY MEDICINE
Payer: MEDICARE

## 2020-01-06 DIAGNOSIS — M41.86: ICD-10-CM

## 2020-01-06 DIAGNOSIS — M51.36: ICD-10-CM

## 2020-01-06 DIAGNOSIS — M54.5: Primary | ICD-10-CM

## 2020-01-06 PROCEDURE — 72110 X-RAY EXAM L-2 SPINE 4/>VWS: CPT

## 2020-01-06 NOTE — RADIOLOGY REPORT (SQ)
EXAM DESCRIPTION:  L SPINE WHOLE



COMPLETED DATE/TIME:  1/6/2020 3:25 pm



REASON FOR STUDY:  (M54.5)LOW BACK PAIN M54.5  LOW BACK PAIN



COMPARISON:  None.



NUMBER OF VIEWS:  Five views including obliques.



TECHNIQUE:  AP, lateral, oblique, and sacral radiographic images acquired of the lumbar spine.



LIMITATIONS:  None.



FINDINGS:  MINERALIZATION: Normal.

SEGMENTATION: Normal.  No transitional anatomy.

ALIGNMENT: Mild scoliosis.

VERTEBRAE: Superior endplate compression changes of uncertain age at L4.  Approximately 1/3 loss of h
eight of the T12 vertebra.

DISCS: Disc spaces narrowed from L1-2 L4.

POSTERIOR ELEMENTS: Hypertrophic facet changes at L5-S1.

HARDWARE: None in the spine.

PARASPINAL SOFT TISSUES: Normal.

PELVIS: Intact as visualized. No fractures or worrisome bone lesions. SI joints intact.

OTHER: No other significant finding.



IMPRESSION:  Mild scoliosis and degenerative disc disease.  Compression changes are present at T12 an
d L4 appear chronic.



TECHNICAL DOCUMENTATION:  JOB ID:  [id number]

 2011 [employer]- All Rights Reserved



Reading location - IP/workstation name: [email or web address]

## 2020-06-29 ENCOUNTER — HOSPITAL ENCOUNTER (OUTPATIENT)
Dept: HOSPITAL 62 - OD | Age: 80
End: 2020-06-29
Attending: FAMILY MEDICINE
Payer: MEDICARE

## 2020-06-29 DIAGNOSIS — R07.9: Primary | ICD-10-CM

## 2020-06-29 LAB
ADD MANUAL DIFF: NO
ANION GAP SERPL CALC-SCNC: 5 MMOL/L (ref 5–19)
BASOPHILS # BLD AUTO: 0.1 10^3/UL (ref 0–0.2)
BASOPHILS NFR BLD AUTO: 0.9 % (ref 0–2)
BUN SERPL-MCNC: 14 MG/DL (ref 7–20)
CALCIUM: 10.2 MG/DL (ref 8.4–10.2)
CHLORIDE SERPL-SCNC: 101 MMOL/L (ref 98–107)
CO2 SERPL-SCNC: 31 MMOL/L (ref 22–30)
EOSINOPHIL # BLD AUTO: 0.3 10^3/UL (ref 0–0.6)
EOSINOPHIL NFR BLD AUTO: 3.7 % (ref 0–6)
ERYTHROCYTE [DISTWIDTH] IN BLOOD BY AUTOMATED COUNT: 12.5 % (ref 11.5–14)
GLUCOSE SERPL-MCNC: 121 MG/DL (ref 75–110)
HCT VFR BLD CALC: 38 % (ref 36–47)
HGB BLD-MCNC: 12.9 G/DL (ref 12–15.5)
LYMPHOCYTES # BLD AUTO: 1.4 10^3/UL (ref 0.5–4.7)
LYMPHOCYTES NFR BLD AUTO: 16.1 % (ref 13–45)
MCH RBC QN AUTO: 30.7 PG (ref 27–33.4)
MCHC RBC AUTO-ENTMCNC: 34 G/DL (ref 32–36)
MCV RBC AUTO: 90 FL (ref 80–97)
MONOCYTES # BLD AUTO: 0.3 10^3/UL (ref 0.1–1.4)
MONOCYTES NFR BLD AUTO: 4.1 % (ref 3–13)
NEUTROPHILS # BLD AUTO: 6.3 10^3/UL (ref 1.7–8.2)
NEUTS SEG NFR BLD AUTO: 75.2 % (ref 42–78)
PLATELET # BLD: 230 10^3/UL (ref 150–450)
POTASSIUM SERPL-SCNC: 4 MMOL/L (ref 3.6–5)
RBC # BLD AUTO: 4.21 10^6/UL (ref 3.72–5.28)
TOTAL CELLS COUNTED % (AUTO): 100 %
WBC # BLD AUTO: 8.4 10^3/UL (ref 4–10.5)

## 2020-06-29 PROCEDURE — 71046 X-RAY EXAM CHEST 2 VIEWS: CPT

## 2020-06-29 PROCEDURE — 85025 COMPLETE CBC W/AUTO DIFF WBC: CPT

## 2020-06-29 PROCEDURE — 36415 COLL VENOUS BLD VENIPUNCTURE: CPT

## 2020-06-29 PROCEDURE — 84484 ASSAY OF TROPONIN QUANT: CPT

## 2020-06-29 PROCEDURE — 80048 BASIC METABOLIC PNL TOTAL CA: CPT

## 2020-06-29 NOTE — RADIOLOGY REPORT (SQ)
EXAM DESCRIPTION:  CHEST PA/LATERAL



IMAGES COMPLETED DATE/TIME:  6/29/2020 3:05 pm



REASON FOR STUDY:  CHEST PAIN



COMPARISON:  9/16/2019



EXAM PARAMETERS:  NUMBER OF VIEWS: two views

TECHNIQUE: Digital Frontal and Lateral radiographic views of the chest acquired.

RADIATION DOSE: NA

LIMITATIONS: none



FINDINGS:  LUNGS AND PLEURA: No opacities, masses or pneumothorax. No pleural effusion.

MEDIASTINUM AND HILAR STRUCTURES: No masses or contour abnormalities.

HEART AND VASCULAR STRUCTURES: Heart normal size.  No evidence for failure.

BONES: Unchanged.

HARDWARE: None in the chest.

OTHER: No other significant finding.



IMPRESSION:  NO SIGNIFICANT RADIOGRAPHIC FINDING IN THE CHEST.



TECHNICAL DOCUMENTATION:  JOB ID:  [id number]

 2011 [employer]- All Rights Reserved



Reading location - IP/workstation name: [email or web address]

## 2020-12-17 ENCOUNTER — HOSPITAL ENCOUNTER (OUTPATIENT)
Dept: HOSPITAL 62 - RAD | Age: 80
End: 2020-12-17
Attending: PHYSICIAN ASSISTANT
Payer: MEDICARE

## 2020-12-17 DIAGNOSIS — H53.40: Primary | ICD-10-CM

## 2020-12-17 PROCEDURE — 70450 CT HEAD/BRAIN W/O DYE: CPT

## 2020-12-17 NOTE — RADIOLOGY REPORT (SQ)
EXAM DESCRIPTION:  CT HEAD WITHOUT



IMAGES COMPLETED DATE/TIME:  12/17/2020 12:54 pm



REASON FOR STUDY:  (H53.40)UNSPECIFIED VISUAL FIELD DEFECTS H53.40  UNSPECIFIED VISUAL FIELD DEFECTS



COMPARISON:  MR 9/18/2019



TECHNIQUE:  Axial images acquired through the brain without intravenous contrast.  Images reviewed wi
th bone, brain and subdural windows.  Additional sagittal and coronal reconstructions were generated.
 Images stored on PACS.

All CT scanners at this facility use dose modulation, iterative reconstruction, and/or weight based d
osing when appropriate to reduce radiation dose to as low as reasonably achievable (ALARA).

[facility]: Dose Right  [facility]: CareDose    [facility]: Dose Right    [facility]: Teradose 4D    [facility]: Smart Technologies



RADIATION DOSE:  CT Rad equipment meets quality standard of care and radiation dose reduction techniq
ues were employed. CTDIvol: 49.0 mGy. DLP: 960 mGy-cm. mGy.



LIMITATIONS:  None.



FINDINGS:  VENTRICLES: Prominent ventricles secondary to involutional atrophy.

CEREBRUM: Cortical atrophy.  No masses.  No hemorrhage.  No midline shift.  No evidence for acute inf
arction. Areas of low density in the white matter most likely chronic small vessel ischemic changes.

CEREBELLUM: No masses.  No hemorrhage.  No alteration of density.  No evidence for acute infarction.

EXTRAAXIAL SPACES: No fluid collections.  No masses.

ORBITS AND GLOBE: No intra- or extraconal masses.  Normal contour of globe without masses.

CALVARIUM: No fracture.

PARANASAL SINUSES: No fluid or mucosal thickening.

SOFT TISSUES: No mass or hematoma.

OTHER: No other significant finding.



IMPRESSION:  MICROVASCULAR ISCHEMIA AND GENERALIZED ATROPHY. NO ACUTE IMAGING FINDINGS IN THE BRAIN

EVIDENCE OF ACUTE STROKE: NO.



COMMENT:  Quality ID # [id number]: Final reports with documentation of one or more dose reduction techniques
 (e.g., Automated exposure control, adjustment of the mA and/or kV according to patient size, use of 
iterative reconstruction technique)



TECHNICAL DOCUMENTATION:  JOB ID:  [id number]

 2011 [employer]- All Rights Reserved



Reading location - IP/workstation name: [email or web address]